# Patient Record
Sex: FEMALE | Race: WHITE | NOT HISPANIC OR LATINO | Employment: OTHER
[De-identification: names, ages, dates, MRNs, and addresses within clinical notes are randomized per-mention and may not be internally consistent; named-entity substitution may affect disease eponyms.]

---

## 2022-07-30 ENCOUNTER — TELEPHONE ENCOUNTER (OUTPATIENT)
Age: 87
End: 2022-07-30

## 2022-07-31 ENCOUNTER — TELEPHONE ENCOUNTER (OUTPATIENT)
Age: 87
End: 2022-07-31

## 2023-05-03 ENCOUNTER — OFFICE VISIT (OUTPATIENT)
Dept: URBAN - METROPOLITAN AREA CLINIC 9 | Facility: CLINIC | Age: 88
End: 2023-05-03

## 2023-05-10 ENCOUNTER — DASHBOARD ENCOUNTERS (OUTPATIENT)
Age: 88
End: 2023-05-10

## 2023-05-10 ENCOUNTER — WEB ENCOUNTER (OUTPATIENT)
Dept: URBAN - METROPOLITAN AREA CLINIC 9 | Facility: CLINIC | Age: 88
End: 2023-05-10

## 2023-05-10 ENCOUNTER — OFFICE VISIT (OUTPATIENT)
Dept: URBAN - METROPOLITAN AREA CLINIC 9 | Facility: CLINIC | Age: 88
End: 2023-05-10
Payer: MEDICARE

## 2023-05-10 VITALS
HEIGHT: 62 IN | WEIGHT: 130 LBS | SYSTOLIC BLOOD PRESSURE: 108 MMHG | DIASTOLIC BLOOD PRESSURE: 68 MMHG | BODY MASS INDEX: 23.92 KG/M2

## 2023-05-10 DIAGNOSIS — R19.7 DIARRHEA, UNSPECIFIED TYPE: ICD-10-CM

## 2023-05-10 PROCEDURE — 99244 OFF/OP CNSLTJ NEW/EST MOD 40: CPT | Performed by: STUDENT IN AN ORGANIZED HEALTH CARE EDUCATION/TRAINING PROGRAM

## 2023-05-10 PROCEDURE — 99204 OFFICE O/P NEW MOD 45 MIN: CPT | Performed by: STUDENT IN AN ORGANIZED HEALTH CARE EDUCATION/TRAINING PROGRAM

## 2023-05-10 RX ORDER — ZOLPIDEM TARTRATE 10 MG/1
TAKE 1 TABLET BY MOUTH EVERY DAY AT BEDTIME FOR 30 DAYS TABLET ORAL
Qty: 14 EACH | Refills: 0 | Status: ACTIVE | COMMUNITY

## 2023-05-10 RX ORDER — NITROFURANTOIN MACROCRYSTALS 50 MG/1
CAPSULE ORAL
Qty: 30 CAPSULE | Status: ACTIVE | COMMUNITY

## 2023-05-10 RX ORDER — CARVEDILOL 3.12 MG/1
TABLET, FILM COATED ORAL
Qty: 60 TABLET | Status: ACTIVE | COMMUNITY

## 2023-05-10 RX ORDER — SPIRONOLACTONE 25 MG/1
TAKE 1 TABLET BY MOUTH EVERY DAY TABLET, FILM COATED ORAL
Qty: 90 EACH | Refills: 1 | Status: ACTIVE | COMMUNITY

## 2023-05-10 NOTE — HPI-TODAY'S VISIT:
Patient has a history of dementia, ichemic cardiomyopathy, ILR placed 5 years ago, HTN, syncope due to dehydration/hypotension, cholecystectomy,  who presents for  Cardiology: Dr Rivera  GI Hx: ER 4/9/23- syncope, slumped over and defecated herself. Prior workup prior to ER visit showed normal ILR interogration, carotid duplex, echo, all normal. UA slightly positive for UTI but U cx normal. WBC in 18.6, anemia 10.8, Given cipro and discharged.  5/10/23-  Has multiple episodes of BMs, takes her 20 min. Starts off with constipation, then has multiple episodes of diarrhea. Some pain in LLQ when has multiple BMs.  NO OTC fiber, miralax.  Daughter with crohns.  Denies weight loss, fever, chills, nausea, vomiting. Denies dysphagia, reflux, UGI symptoms. Denies hematemesis, melena, hematochezia, blood per rectum.  EGD: None  Colonoscopy: >10 y/a- polyps.  Imaging/Studies/Procedures: 4/9/23- WBC 18.6, Hgb 10.8, plt normal. CO2 20, no anion gap. LA 1.7.

## 2023-06-09 LAB
(TTG) AB, IGA: <1
(TTG) AB, IGG: <1
ABSOLUTE BASOPHILS: 226
ABSOLUTE EOSINOPHILS: 296
ABSOLUTE LYMPHOCYTES: 2482
ABSOLUTE MONOCYTES: 1043
ABSOLUTE NEUTROPHILS: (no result)
BASOPHILS: 1.6
C-REACTIVE PROTEIN, QUANT: 1.6
CALPROTECTIN, FECAL: 22
CAMPYLOBACTER SPP. AG,EIA: (no result)
CLOSTRIDIUM DIFFICILE TOXINB,QL REAL TIME PCR: NOT DETECTED
CLOSTRIDIUM DIFFICILE: (no result)
CRYPTOSPORIDIUM ANTIGEN,: (no result)
EOSINOPHILS: 2.1
GIARDIA AG, EIA, STOOL: (no result)
GLIADIN (DEAMIDATED) AB (IGA): <1
GLIADIN (DEAMIDATED) AB (IGG): <1
HELICOBACTER PYLORI AG, EIA, STOOL: (no result)
HEMATOCRIT: 35.7
HEMOGLOBIN: 12.1
IMMUNOGLOBULIN A: 170
LYMPHOCYTES: 17.6
MCH: 30.6
MCHC: 33.9
MCV: 90.2
MONOCYTES: 7.4
MPV: 9.8
NEUTROPHILS: 71.3
OVA AND PARASITES, CONC AND PERM SMEAR: (no result)
PANCREATIC ELASTASE, FECAL: >500
PLATELET COUNT: 415
RDW: 14.4
RED BLOOD CELL COUNT: 3.96
SALMONELLA AND SHIGELLA, CULTURE: (no result)
SHIGA TOXINS, EIA W/RFL TO E.COLI O157 CULTURE: (no result)
TSH: 5.94
WHITE BLOOD CELL COUNT: 14.1

## 2023-06-14 ENCOUNTER — TELEPHONE ENCOUNTER (OUTPATIENT)
Dept: URBAN - METROPOLITAN AREA CLINIC 7 | Facility: CLINIC | Age: 88
End: 2023-06-14

## 2023-06-14 ENCOUNTER — OFFICE VISIT (OUTPATIENT)
Dept: URBAN - METROPOLITAN AREA CLINIC 7 | Facility: CLINIC | Age: 88
End: 2023-06-14

## 2023-06-14 NOTE — HPI-TODAY'S VISIT:
Patient has a history of dementia, ichemic cardiomyopathy, ILR placed 5 years ago, HTN, syncope due to dehydration/hypotension, cholecystectomy,  who presents for follow up  Cardiology: Dr Rivera  GI Hx: ER 4/9/23- syncope, slumped over and defecated herself. Prior workup prior to ER visit showed normal ILR interogration, carotid duplex, echo, all normal. UA slightly positive for UTI but U cx normal. WBC in 18.6, anemia 10.8, Given cipro and discharged. 5/10/23-  Has multiple episodes of BMs, takes her 20 min. Starts off with constipation, then has multiple episodes of diarrhea. Some pain in LLQ when has multiple BMs. 6/14/23-  *** CTE?  Daughter with crohns.  Denies weight loss, fever, chills, nausea, vomiting. Denies dysphagia, reflux, UGI symptoms. Denies hematemesis, melena, hematochezia, blood per rectum.  EGD: None  Colonoscopy: >10 y/a- polyps.  Imaging/Studies/Procedures: 4/9/23- WBC 18.6, Hgb 10.8, plt normal. CO2 20, no anion gap. LA 1.7. celiac test, giardia, CRP, stool culture, O/P, elastase, calprotectin, H pylori, crypto c diff (colonized), all normal. 5/2023- TSH elevated to 5.94, should address with PCP. CBC with elevated WBC and plt count, should address with PCP, leukemia?.

## 2024-06-19 ENCOUNTER — TELEPHONE ENCOUNTER (OUTPATIENT)
Dept: URBAN - METROPOLITAN AREA CLINIC 7 | Facility: CLINIC | Age: 89
End: 2024-06-19